# Patient Record
Sex: MALE | Race: WHITE | NOT HISPANIC OR LATINO | Employment: FULL TIME | URBAN - METROPOLITAN AREA
[De-identification: names, ages, dates, MRNs, and addresses within clinical notes are randomized per-mention and may not be internally consistent; named-entity substitution may affect disease eponyms.]

---

## 2022-05-25 DIAGNOSIS — M79.2 NEURALGIA AND NEURITIS, UNSPECIFIED: Primary | ICD-10-CM

## 2022-12-20 DIAGNOSIS — G56.00 CTS (CARPAL TUNNEL SYNDROME): Primary | ICD-10-CM

## 2023-01-03 ENCOUNTER — PROCEDURE VISIT (OUTPATIENT)
Dept: NEUROLOGY | Facility: CLINIC | Age: 43
End: 2023-01-03
Payer: OTHER GOVERNMENT

## 2023-01-03 DIAGNOSIS — G56.03 BILATERAL CARPAL TUNNEL SYNDROME: Primary | ICD-10-CM

## 2023-01-03 DIAGNOSIS — G56.00 CTS (CARPAL TUNNEL SYNDROME): ICD-10-CM

## 2023-01-03 PROCEDURE — 95886 MUSC TEST DONE W/N TEST COMP: CPT | Mod: 26,S$PBB,, | Performed by: PSYCHIATRY & NEUROLOGY

## 2023-01-03 PROCEDURE — 95911 NRV CNDJ TEST 9-10 STUDIES: CPT | Mod: 26,S$PBB,, | Performed by: PSYCHIATRY & NEUROLOGY

## 2023-01-03 PROCEDURE — 95885 PR MUSC TST DONE W/NERV TST LIM: ICD-10-PCS | Mod: 26,59,S$PBB, | Performed by: PSYCHIATRY & NEUROLOGY

## 2023-01-03 PROCEDURE — 95885 MUSC TST DONE W/NERV TST LIM: CPT | Mod: 26,59,S$PBB, | Performed by: PSYCHIATRY & NEUROLOGY

## 2023-01-03 PROCEDURE — 95886 PR EMG COMPLETE, W/ NERVE CONDUCTION STUDIES, 5+ MUSCLES: ICD-10-PCS | Mod: 26,S$PBB,, | Performed by: PSYCHIATRY & NEUROLOGY

## 2023-01-03 PROCEDURE — 95911 NRV CNDJ TEST 9-10 STUDIES: CPT | Mod: PBBFAC | Performed by: PSYCHIATRY & NEUROLOGY

## 2023-01-03 PROCEDURE — 95885 MUSC TST DONE W/NERV TST LIM: CPT | Mod: PBBFAC,59 | Performed by: PSYCHIATRY & NEUROLOGY

## 2023-01-03 PROCEDURE — 95886 MUSC TEST DONE W/N TEST COMP: CPT | Mod: PBBFAC | Performed by: PSYCHIATRY & NEUROLOGY

## 2023-01-03 PROCEDURE — 95911 PR NERVE CONDUCTION STUDY; 9-10 STUDIES: ICD-10-PCS | Mod: 26,S$PBB,, | Performed by: PSYCHIATRY & NEUROLOGY

## 2023-01-03 NOTE — PROCEDURES
Procedures    EMG/NCS report      Referring Physician:  Darcy Barnes MD      HPI:  This is a 42-year-old man with bilateral upper extremity numbness.      Exam:  Limited neurologic examination shows 5/5 strength in bilateral upper extremities      Nerve conduction study findings:  Left median antidromic sensory nerve conduction study showed prolonged peak latency and normal amplitude.  Right median antidromic sensory nerve conduction study showed normal peak latency and normal amplitude.  Left ulnar antidromic sensory nerve conduction study showed normal peak latency and normal amplitude.  Right ulnar antidromic sensory nerve conduction study showed normal peak latency and normal amplitude.  Bilateral radial antidromic sensory nerve conduction study showed normal peak latencies and low amplitudes.  Left median orthodromic sensory nerve conduction study showed prolonged peak latency and low amplitude.  Right median orthodromic sensory nerve conduction study showed prolonged peak latency and low amplitude.  Bilateral ulnar orthodromic sensory nerve conduction study showed normal peak latencies and low amplitudes.  Left median motor nerve conduction study showed normal distal motor latencies, normal CMAP amplitudes, normal motor nerve conduction velocities.  Right median motor nerve conduction study showed prolonged distal motor latency, low CMAP amplitude proximally with normal CMAP amplitude distally, normal motor nerve conduction velocity.  This suggests the presence of a Leif Gail anastomosis on the right.  Left ulnar motor nerve conduction study showed normal distal motor latency, normal CMAP amplitudes, normal motor nerve conduction velocities.  Right ulnar motor nerve conduction study showed normal distal motor latencies, normal CMAP amplitudes, normal motor nerve conduction velocities.  The left median to ulnar distal motor latency difference was prolonged greater than 1.4 milliseconds.  The right median to  ulnar distal motor latency difference was prolonged at greater than 1.4 milliseconds.  Right median F-wave was absent.  Otherwise F-wave minimal latencies were within normal limits.      EMG findings:  Concentric needle EMG was performed on the right 1st dorsal interosseous, abductor pollicis brevis, extensor digitorum communis, flexor carpi radialis, triceps, biceps, deltoid, cervical paraspinals, left 1st dorsal interosseous and abductor pollicis brevis.  The right abductor pollicis brevis showed polyphasic motor unit action potentials.  Otherwise all muscles tested were normal.      Impression:  This is an abnormal electrophysiological study.  This study shows electrodiagnostic evidence of bilateral median neuropathy at the wrist i.e. carpal tunnel syndrome which is severe on the right and moderate on the left.  There is no clear-cut electrodiagnostic evidence of right cervical radiculopathy.

## 2025-08-14 DIAGNOSIS — R91.1 SOLITARY PULMONARY NODULE: Primary | ICD-10-CM

## 2025-08-18 DIAGNOSIS — R91.1 SOLITARY PULMONARY NODULE: Primary | ICD-10-CM
